# Patient Record
(demographics unavailable — no encounter records)

---

## 2017-01-01 NOTE — NEWBORN DISCHARGE
Delivery Information


Date of Service


2017.





[place] Information


 YOB: 2017


[place] Time of Birth:  05:59


Infant Head Circumference:  35


Sex:  Female


Race:  





Attendance at Delivery


Pediatrician ATTN at delivery?:  No





Method of Delivery


Delivery Type:  vaginal delivery





Gestational Age


Gestational Age:  39.2





Mother's Information


Demographics:  Age (32),  (1), Para (1)


Marital Status:  


 Name:  Palak Brandt


Blood Type:  A, rh +


Group B Strep Status:  negative


VDRL:  Non-reactive


Rubella Status:  Immune


HbSAg:  negative


HIV:  negative


Chlamydia:  negative


Gonorrhea:  negative


HSV:  negative





Delivery Care


Resuscitation:  stimulation/drying


Transported to nursery:  doing well





APGAR Scoring


APGAR 1 Minute:  9


APGAR 5 minute:  9





Discharge Physical


Admission Date:  Oct 31, 2017


Infant Head Circumference:  35


 Length (height) inches:  20.5


[place] Birth Weight:


3.542 kg        7lbs  12.9oz


Discharge Weight:


3.410kg         7lbs 8.3oz


Weight Change (Kilograms):  -0.132


Percent Weight Change:  -4.00


Discharge Date:  2017


Physical Examination


General Appearance:  + normal appearance, + normal tone


Skin:  + pertinent finding (salmon patch on right cheek, left eyelid, and nape)

, No rash, No abnormal lesions, No jaundice


Head/Neck:  + anterior fontanelle open & flat


Eyes:  + red reflex bilaterally


Ears, Nose, Throat:  No lip deformity, No gum deformity, No palate deformity, 

No ear deformity, No cleft lip, No cleft palate


Thorax:  + normal appearance


Lungs:  + clear, No abnormal respiratory effort


Heart:  + regular rate and rhythm, + normal pulses, No abnormal rhythm, No 

murmur


Abdomen:  + normal bowel sounds, + soft, No mass


Female Genitalia:  + normal female


Trunk & Spine:  No abnormalities


Extremities:  + clavicles intact, + normal hips, No hip click


Reflexes:  + normal ashley, + normal suck, + normal grasp


Anus:  patent





Laboratory Results











Test


  10/31/17


21:33


 


Bedside Glucose


  59 mg/dl


(40-90)











Impression & Diagnosis


healthy, term, AGA





(1) Term birth of  female





Jaundice Risk Assessment


minimal





Hepatitis B Vaccine


Hepatitis B Vaccine Given On:  Oct 31, 2017





Discharge Comments


Hospital Course:  


(1) Term birth of  female


Condition at Discharge:  Stable


Type of Feeding:  Breast


Feeding:  well


Follow-Up Date:  Nov 3, 2017


Additional Comments:


[facility] at [facility] on Fri 11/3 with Dr. Huffman at 12:45

## 2017-01-01 NOTE — NEWBORN ADMISSION
Delivery Information


Date of Service


Oct 31, 2017.





[place] Information


[place] YOB: 2017


 Time of Birth:  05:59


[place] Birth Weight:


3.542 kg        7lbs  12.9oz


 Length (height) inches:  20.5


Infant Head Circumference:  35


Sex:  Female


Race:  





Attendance at Delivery


Pediatrician ATTN at delivery?:  No





Method of Delivery


Delivery Type:  vaginal delivery





Gestational Age


Gestational Age:  39.2





Mother's Information


Demographics:  Age (32),  (1), Para (1)


Marital Status:  


Blood Type:  A, rh +


Group B Strep Status:  negative


VDRL:  Non-reactive


Rubella Status:  Immune


HbSAg:  negative


HIV:  negative


Chlamydia:  negative


Gonorrhea:  negative


HSV:  negative





Delivery Care


Resuscitation:  stimulation/drying


Transported to nursery:  doing well





APGAR Scoring


APGAR 1 Minute:  9


APGAR 5 minute:  9





Admission Physical


Physical Examination


General Appearance:  + normal appearance, + normal tone


Skin:  No abnormal lesions


Head/Neck:  + anterior fontanelle open & flat


Eyes:  + pertinent finding (did not visualize RR in DR)


Ears, Nose, Throat:  No lip deformity, No gum deformity, No palate deformity, 

No ear deformity, No cleft lip, No cleft palate


Thorax:  + normal appearance


Lungs:  + clear, No abnormal respiratory effort


Heart:  + regular rate and rhythm, + abnormal rhythm, + normal pulses, No murmur


Abdomen:  + normal bowel sounds, + soft, No mass


Female Genitalia:  + normal female


Trunk & Spine:  No abnormalities


Extremities:  + clavicles intact, + normal hips, No hip click


Reflexes:  + normal ashley, + normal suck, + normal grasp


Anus:  patent





Impression


healthy, term, AGA





(1) Term birth of  female





Comments


Mom with Gest DM, initial accucheck 50.

## 2017-01-01 NOTE — DISCHARGE INSTRUCTIONS
Discharge Instructions


Date of Service


2017.





Birthday & Weight Information


Birthday:  10/31/17        


Time of Birth:  05:59


Birth Weight:  3.542 kg   7lbs  12.9oz





.





Discharge Weight Information


.


Discharge Weight:  3.410kg   7lbs 8.3oz


Weight Change (Kilograms):   -0.132         


Percent Weight Change:   -4.00 % 





.





Impression / Diagnosis


Impression / Diagnosis:  


(1) Term birth of  female





[place] Blood Type


.





[place] Supplemental Screening has been completed.





.





Procedures


Procedures Performed:  none





Hepatitis B Vaccine


1st Hepatitis B Vaccine Given:  Oct 31, 2017





Instructions


Type of Feeding:  Breast


.





Feeding Instructions





If Breastfeeding:





* Feed baby at least 8-10 times in 24 hours.


* Babies most often nurse every 2-3 hours.  Time this from the beginning of the 

first feeding to the beginning of the next.


* Complete breastfeeding log record.  Take with you to your first visit with 

the baby's doctor.


* Call doctor if baby has less wet or soiled diapers than expected.





.





Baby's Office Visit


Follow-Up:  Nov 3, 2017


[facility] at [facility] on Fri 11/3 with Dr. Huffman at 12:45





Provider Instructions


.





SPECIAL CARE INSTRUCTIONS:





Bathing:





* Sponge baths every 2-3 days.  No tub baths until cord is completely healed.  

This usually takes 10-14 days.











Call your baby's doctor if:





* Temperature is greater that or equal to 100.4 degrees Fahrenheit or 38.0 

degrees Celsius.  Any fever up to the age of eight weeks needs to be evaluated 

by the physician.  Do not give any medications to infants without first talking 

with their physician.





* Yellow/green drainage, foul odor, increased redness or swelling of cord/

circumcision.





* Unable to awaken baby or excessive irritability.





* Your infant has any green vomiting.





* Diarrhea (frequent large watery stools or bloody/mucousy stools).





* Breathing difficulty (other than stuffy nose).





* Skin color changes.


 * blue spells


 * increased jaundice (yellow) that is not improving











Instructions noted above were prepared by Moisés Arguello.





.